# Patient Record
Sex: MALE | URBAN - METROPOLITAN AREA
[De-identification: names, ages, dates, MRNs, and addresses within clinical notes are randomized per-mention and may not be internally consistent; named-entity substitution may affect disease eponyms.]

---

## 2023-11-02 ENCOUNTER — OFFICE VISIT (OUTPATIENT)
Dept: URBAN - METROPOLITAN AREA CLINIC 115 | Facility: CLINIC | Age: 23
End: 2023-11-02

## 2023-12-07 ENCOUNTER — DASHBOARD ENCOUNTERS (OUTPATIENT)
Age: 23
End: 2023-12-07

## 2023-12-07 ENCOUNTER — LAB OUTSIDE AN ENCOUNTER (OUTPATIENT)
Dept: URBAN - METROPOLITAN AREA CLINIC 115 | Facility: CLINIC | Age: 23
End: 2023-12-07

## 2023-12-07 ENCOUNTER — OFFICE VISIT (OUTPATIENT)
Dept: URBAN - METROPOLITAN AREA CLINIC 115 | Facility: CLINIC | Age: 23
End: 2023-12-07
Payer: COMMERCIAL

## 2023-12-07 VITALS
SYSTOLIC BLOOD PRESSURE: 129 MMHG | DIASTOLIC BLOOD PRESSURE: 86 MMHG | TEMPERATURE: 99.6 F | HEART RATE: 62 BPM | BODY MASS INDEX: 30.1 KG/M2 | HEIGHT: 72 IN | WEIGHT: 222.2 LBS
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
## 2023-12-07 DIAGNOSIS — Z93.3 COLOSTOMY STATUS: ICD-10-CM

## 2023-12-07 PROBLEM — 161687003: Status: ACTIVE | Noted: 2023-12-07

## 2023-12-07 PROCEDURE — 99204 OFFICE O/P NEW MOD 45 MIN: CPT | Performed by: INTERNAL MEDICINE

## 2023-12-07 RX ORDER — PREGABALIN CAPSULES, CV 75 MG/1
CAPSULE ORAL
Qty: 60 CAPSULE | Status: ACTIVE | COMMUNITY

## 2023-12-07 RX ORDER — MELOXICAM 7.5 MG/1
TABLET ORAL
Qty: 30 TABLET | Status: ACTIVE | COMMUNITY

## 2023-12-07 RX ORDER — SODIUM PICOSULFATE, MAGNESIUM OXIDE, AND ANHYDROUS CITRIC ACID 10; 3.5; 12 MG/160ML; G/160ML; G/160ML
160 ML LIQUID ORAL ONCE
Qty: 2 | Refills: 0 | OUTPATIENT
Start: 2023-12-07 | End: 2023-12-08

## 2023-12-07 NOTE — HPI-TODAY'S VISIT:
12/7/2023 Patient is a 22 year old  male who presents for a colon cancer screening consultation. Patient has a colostomy and states that he had it placed due to shattering his pelvis due to an accident. He is here for wanting to get it reversed. Patient states that now everything has healed such as sepsis and necrosis around wound area. He states that the colostomy bag has been helping. He denies family history of colon cancer or poylps. Patient had his tailbone removed as well and states that he sometimes has sensation in his rectum.

## 2024-03-05 ENCOUNTER — COLON (OUTPATIENT)
Dept: URBAN - METROPOLITAN AREA MEDICAL CENTER 24 | Facility: MEDICAL CENTER | Age: 24
End: 2024-03-05